# Patient Record
Sex: FEMALE | Race: WHITE | Employment: FULL TIME | URBAN - METROPOLITAN AREA
[De-identification: names, ages, dates, MRNs, and addresses within clinical notes are randomized per-mention and may not be internally consistent; named-entity substitution may affect disease eponyms.]

---

## 2018-04-26 ENCOUNTER — PATIENT OUTREACH (OUTPATIENT)
Dept: FAMILY MEDICINE CLINIC | Facility: CLINIC | Age: 28
End: 2018-04-26

## 2018-04-26 NOTE — PROGRESS NOTES
Left message for patient to call office to confirm PCP or to schedule annual physical and pap smear.

## 2018-09-18 ENCOUNTER — OFFICE VISIT (OUTPATIENT)
Dept: FAMILY MEDICINE CLINIC | Facility: CLINIC | Age: 28
End: 2018-09-18
Payer: COMMERCIAL

## 2018-09-18 VITALS
DIASTOLIC BLOOD PRESSURE: 70 MMHG | SYSTOLIC BLOOD PRESSURE: 110 MMHG | HEART RATE: 58 BPM | HEIGHT: 67 IN | WEIGHT: 128 LBS | OXYGEN SATURATION: 98 % | TEMPERATURE: 99 F | BODY MASS INDEX: 20.09 KG/M2

## 2018-09-18 DIAGNOSIS — B37.3 VAGINA, CANDIDIASIS: ICD-10-CM

## 2018-09-18 DIAGNOSIS — K42.9 UMBILICAL HERNIA WITHOUT OBSTRUCTION AND WITHOUT GANGRENE: Primary | ICD-10-CM

## 2018-09-18 PROCEDURE — 99203 OFFICE O/P NEW LOW 30 MIN: CPT | Performed by: FAMILY MEDICINE

## 2018-09-18 RX ORDER — FLUCONAZOLE 150 MG/1
150 TABLET ORAL DAILY
Qty: 2 TABLET | Refills: 6 | Status: SHIPPED | OUTPATIENT
Start: 2018-09-18 | End: 2018-09-19

## 2018-09-18 NOTE — PROGRESS NOTES
Sharon Rodriguez is a 28year old female. Patient presents with:  Establish Care: hernia abdominal noticed a few a weeks ago. yeast infection symptoms for a few months. . .room 5    Subjective   HPI:   Abdomen hernia  Noted after eating  Above belly butto denies shortness of breath with exertion  CARDIOVASCULAR: denies chest pain on exertion  GI: denies abdominal pain and denies heartburn  See HPI re hernia   see HPI   NEURO: denies headaches     Objective   EXAM:   /70   Pulse 58   Temp 98.6 °F (37

## 2018-09-19 ENCOUNTER — TELEPHONE (OUTPATIENT)
Dept: FAMILY MEDICINE CLINIC | Facility: CLINIC | Age: 28
End: 2018-09-19

## 2018-09-19 DIAGNOSIS — B37.3 VAGINA, CANDIDIASIS: ICD-10-CM

## 2018-09-19 RX ORDER — FLUCONAZOLE 150 MG/1
150 TABLET ORAL DAILY
Qty: 2 TABLET | Refills: 6 | Status: SHIPPED | OUTPATIENT
Start: 2018-09-19 | End: 2018-09-21

## 2018-09-19 NOTE — TELEPHONE ENCOUNTER
fluconazole (DIFLUCAN) 150 MG Oral Tab MEDICATION WAS SENT TO THE WRONG PHARMACY. PLEASE RE-S[facility]

## 2018-09-20 NOTE — PATIENT INSTRUCTIONS
Hernia (Adult)    A hernia can happen when there is a weakness or defect in the wall of the abdomen or groin. Intestines or nearby tissues may move from their usual location and push through the weakness in the wall.  This can cause a hernia (bulge) you m · Incarcerated/strangulated. The intestine is trapped (incarcerated). If this happens, you won’t be able to push the bulge back in. If the incarcerated hernia isn’t treated, it may become strangulated.  This means the area loses blood supply and the tissue · Severe pain, redness, or tenderness in the area near the hernia  · Pain worsens quickly and doesn’t get better  · Inability to have a bowel movement or pass gas  · Fever of 100.4°F (38°C) or higher, or as directed by your healthcare provider  Date Last R

## 2018-10-02 ENCOUNTER — OFFICE VISIT (OUTPATIENT)
Dept: SURGERY | Facility: CLINIC | Age: 28
End: 2018-10-02
Payer: COMMERCIAL

## 2018-10-02 VITALS — BODY MASS INDEX: 20.57 KG/M2 | HEIGHT: 66 IN | WEIGHT: 128 LBS | HEART RATE: 54 BPM | TEMPERATURE: 99 F

## 2018-10-02 DIAGNOSIS — K42.9 UMBILICAL HERNIA WITHOUT OBSTRUCTION AND WITHOUT GANGRENE: Primary | ICD-10-CM

## 2018-10-02 PROCEDURE — 99244 OFF/OP CNSLTJ NEW/EST MOD 40: CPT | Performed by: SURGERY

## 2018-10-02 NOTE — H&P
Chun Alcantar is a 28year old female  Patient presents with:  Hernia      REFERRED BY    Patient presents with pain at the umbilicus.   Patient states that she has been noticing tissue which comes out of her umbilical area and then causes incarceration abuse-last 7/2013      ROS     GENERAL HEALTH: otherwise feels well, no weight loss, no fever or chills  SKIN: denies any unusual skin rashes or jaundice  HEENT: denies nasal congestion, sinus pain or sore throat; hearing loss negative,   EYES , no diplopi NEGATIVE mg/dL Final   • BLOOD URINE 08/22/2013 TRACE-INTACT* NEGATIVE Final   • [facility] URINE 08/22/2013 6.0  4.5 - 8.0 Final   • PROTEIN URINE 08/22/2013 NEGATIVE  NEGATIVE mg/dL Final   • UROBILINOGEN URINE 08/22/2013 0.2  0.2 - 2.0 mg/dL Final   • NITRITE U square meters   • GFR NON- 08/22/2013 113  >60 Final    Estimated GFR units: mL/min/1.73 square meters   • CALCIUM 08/22/2013 8.6* 8.9 - 10.3 mg/dL Final    Comment: Corrected Calcium Formula:                           ((4.0 - ALBUMIN) x 0. PHOSPHATASE 08/23/2013 44* 52 - 144 U/L Final   • AST 08/23/2013 13* 15 - 41 U/L Final   • ALT 08/23/2013 20  14 - 54 U/L Final   • BILIRUBIN, TOTAL 08/23/2013 0.6  0.1 - 2.0 mg/dL Final   • TOTAL PROTEIN 08/23/2013 5.3* 6.1 - 8.3 g/dL Final   • WBC 08/23/

## 2018-12-04 ENCOUNTER — OFFICE VISIT (OUTPATIENT)
Dept: SURGERY | Facility: CLINIC | Age: 28
End: 2018-12-04

## 2018-12-04 VITALS
BODY MASS INDEX: 20.09 KG/M2 | HEIGHT: 66 IN | RESPIRATION RATE: 16 BRPM | HEART RATE: 50 BPM | DIASTOLIC BLOOD PRESSURE: 85 MMHG | WEIGHT: 125 LBS | TEMPERATURE: 98 F | SYSTOLIC BLOOD PRESSURE: 121 MMHG

## 2018-12-04 DIAGNOSIS — K43.2 INCISIONAL HERNIA, WITHOUT OBSTRUCTION OR GANGRENE: Primary | ICD-10-CM

## 2018-12-04 PROCEDURE — 99024 POSTOP FOLLOW-UP VISIT: CPT | Performed by: PHYSICIAN ASSISTANT

## 2018-12-04 NOTE — PROGRESS NOTES
Post Operative Visit Note       Active Problems  1.  Incisional hernia, without obstruction or gangrene         Chief Complaint   Patient presents with:  Post-Op: 11/21 incisional herniorrhaphy with primary repair, pt c/o of dull-ache pain behind belly butt today.     Family History   Problem Relation Age of Onset   • Psychiatric Father    • Other (Other) Father    • Other (anxiety) Father    • Other (Other) Mother    • Other (osteoporosis) Mother      Social History    Socioeconomic History      Marital statu Neurological: Negative for tremors, syncope and weakness. Hematological: Negative for adenopathy. Does not bruise/bleed easily. Psychiatric/Behavioral: Negative for behavioral problems and sleep disturbance.        Physical Findings   /85   Puls patient can see me on an as-needed basis. This patient should return urgently for any problems or complications related to the surgical intervention. The patient voiced understanding and agreement with this plan.            No orders of the defined types

## 2018-12-07 ENCOUNTER — TELEPHONE (OUTPATIENT)
Dept: SURGERY | Facility: CLINIC | Age: 28
End: 2018-12-07

## 2018-12-07 NOTE — TELEPHONE ENCOUNTER
Completed [facility] paperwork faxed to [employer] [phone]. Copies mailed to pt, pt informed.

## 2018-12-07 NOTE — TELEPHONE ENCOUNTER
Pts employer phoned office. Received STD papers, would like to know if Pt can return to work with restrictions after 12/11.  Please ask surgeon if it would be possible and fax letter

## 2019-07-01 ENCOUNTER — OFFICE VISIT (OUTPATIENT)
Dept: OBGYN CLINIC | Facility: CLINIC | Age: 29
End: 2019-07-01
Payer: COMMERCIAL

## 2019-07-01 VITALS
SYSTOLIC BLOOD PRESSURE: 102 MMHG | DIASTOLIC BLOOD PRESSURE: 80 MMHG | HEIGHT: 66 IN | WEIGHT: 137 LBS | BODY MASS INDEX: 22.02 KG/M2

## 2019-07-01 DIAGNOSIS — Z12.4 SCREENING FOR MALIGNANT NEOPLASM OF CERVIX: ICD-10-CM

## 2019-07-01 DIAGNOSIS — Z11.3 SCREEN FOR SEXUALLY TRANSMITTED DISEASES: ICD-10-CM

## 2019-07-01 DIAGNOSIS — N76.0 VAGINITIS AND VULVOVAGINITIS: ICD-10-CM

## 2019-07-01 DIAGNOSIS — Z12.39 ENCOUNTER FOR SCREENING BREAST EXAMINATION: ICD-10-CM

## 2019-07-01 DIAGNOSIS — Z01.419 WELL WOMAN EXAM WITH ROUTINE GYNECOLOGICAL EXAM: Primary | ICD-10-CM

## 2019-07-01 PROCEDURE — 87660 TRICHOMONAS VAGIN DIR PROBE: CPT | Performed by: NURSE PRACTITIONER

## 2019-07-01 PROCEDURE — 87510 GARDNER VAG DNA DIR PROBE: CPT | Performed by: NURSE PRACTITIONER

## 2019-07-01 PROCEDURE — 99385 PREV VISIT NEW AGE 18-39: CPT | Performed by: NURSE PRACTITIONER

## 2019-07-01 PROCEDURE — 88175 CYTOPATH C/V AUTO FLUID REDO: CPT | Performed by: NURSE PRACTITIONER

## 2019-07-01 PROCEDURE — 87624 HPV HI-RISK TYP POOLED RSLT: CPT | Performed by: NURSE PRACTITIONER

## 2019-07-01 PROCEDURE — 87591 N.GONORRHOEAE DNA AMP PROB: CPT | Performed by: NURSE PRACTITIONER

## 2019-07-01 PROCEDURE — 87491 CHLMYD TRACH DNA AMP PROBE: CPT | Performed by: NURSE PRACTITIONER

## 2019-07-01 PROCEDURE — 87480 CANDIDA DNA DIR PROBE: CPT | Performed by: NURSE PRACTITIONER

## 2019-07-01 NOTE — PROGRESS NOTES
HPI:   Bettyjane Schirmer is a 33year old  who presents for an annual gynecological exam. Pt reports she has had vaginal odor and discharge \"on and off\" for the past year. She has tried treating it with Monistat OTC, but this has not helped.  She d CONSTITUTIONAL: generally feels well   SKIN: denies any unusual skin lesions, rash, itchiness  HEENT: denies nasal congestion, sinus pain or sore throat; denies blurred vision, visual changes  CARDIOVASCULAR: denies chest pain   LUNGS:  denies shortness specifically with yogurt, milk, and cheeses. · I encouraged pt to maintain taking a daily women's vitamin.   · I encouraged monthly self breast exams; pt was told to perform these in the shower; she was instructed to perform these 7-10 days after her mense

## 2019-07-02 LAB
C TRACH DNA SPEC QL NAA+PROBE: NEGATIVE
HPV I/H RISK 1 DNA SPEC QL NAA+PROBE: NEGATIVE
N GONORRHOEA DNA SPEC QL NAA+PROBE: NEGATIVE

## 2019-07-02 RX ORDER — METRONIDAZOLE 500 MG/1
500 TABLET ORAL 2 TIMES DAILY
Qty: 14 TABLET | Refills: 0 | Status: SHIPPED | OUTPATIENT
Start: 2019-07-02 | End: 2019-07-09

## 2019-07-03 ENCOUNTER — TELEPHONE (OUTPATIENT)
Dept: OBGYN CLINIC | Facility: CLINIC | Age: 29
End: 2019-07-03

## 2019-07-22 ENCOUNTER — TELEPHONE (OUTPATIENT)
Dept: OBGYN CLINIC | Facility: CLINIC | Age: 29
End: 2019-07-22

## 2019-07-22 RX ORDER — METRONIDAZOLE 500 MG/1
500 TABLET ORAL 2 TIMES DAILY
Qty: 14 TABLET | Refills: 0 | Status: SHIPPED | OUTPATIENT
Start: 2019-07-22 | End: 2019-07-29

## 2019-07-22 NOTE — TELEPHONE ENCOUNTER
Seen 2 weeks ago for   a bacterial infection, done with medication. She then started her period and then symptoms came back.  Refill medication

## 2019-07-22 NOTE — TELEPHONE ENCOUNTER
Patient was treated a few weeks ago for bv. Had her menses and the symptoms came back. Has white milky discharge, thick with odor. Asking for refill on the Flagyl?

## 2019-07-25 PROBLEM — Z01.419 WELL WOMAN EXAM WITH ROUTINE GYNECOLOGICAL EXAM: Status: RESOLVED | Noted: 2019-07-01 | Resolved: 2019-07-25

## 2019-07-25 PROBLEM — K43.2 INCISIONAL HERNIA, WITHOUT OBSTRUCTION OR GANGRENE: Status: RESOLVED | Noted: 2018-12-04 | Resolved: 2019-07-25

## 2019-07-25 PROBLEM — N76.0 VAGINITIS AND VULVOVAGINITIS: Status: RESOLVED | Noted: 2019-07-01 | Resolved: 2019-07-25

## 2019-07-25 NOTE — PROGRESS NOTES
HPI:   Anxiety   This is a recurrent problem. Episode onset: more in the last 2 years, worse in last 2 months. She is having a lot of trouble sleeping. The problem occurs daily. Progression since onset: affecting her sleep.  Pertinent negatives include no APPENDECTOMY N/A 8/22/2013    Performed by Dustin Kwon MD at [facility] MAIN OR   • [address] [place] [address]  @ 11 yrs old   • MYRINGOTOMY, LASER-ASSISTED      Right ear with less hearing.     • TONSILLECTOMY        Family History   Problem Relation appears anxious (milldy). Withdrawn: slightly. Cognition and memory are normal.          ASSESSMENT AND PLAN:   Diagnoses and all orders for this visit:    Social anxiety disorder  -      NAVIGATOR  -     clonazePAM 0.5 MG Oral Tablet Dispersible;  Take 1

## 2019-08-13 DIAGNOSIS — F40.10 SOCIAL ANXIETY DISORDER: ICD-10-CM

## 2019-08-13 RX ORDER — CLONAZEPAM 0.5 MG/1
0.5 TABLET, ORALLY DISINTEGRATING ORAL NIGHTLY PRN
Qty: 10 TABLET | Refills: 0 | Status: SHIPPED | OUTPATIENT
Start: 2019-08-13 | End: 2019-12-03

## 2019-08-13 NOTE — TELEPHONE ENCOUNTER
Electronically submitted to pharmacy  --Dayan---I filled this as you were done 8/13/2019 ---just to let you know---if there is a need to change treatment plans or further refills--let me know---MARGARET

## 2019-12-03 ENCOUNTER — OFFICE VISIT (OUTPATIENT)
Dept: OBGYN CLINIC | Facility: CLINIC | Age: 29
End: 2019-12-03
Payer: COMMERCIAL

## 2019-12-03 VITALS
BODY MASS INDEX: 21.38 KG/M2 | RESPIRATION RATE: 12 BRPM | HEART RATE: 80 BPM | TEMPERATURE: 98 F | HEIGHT: 66.25 IN | WEIGHT: 133 LBS | DIASTOLIC BLOOD PRESSURE: 80 MMHG | SYSTOLIC BLOOD PRESSURE: 128 MMHG

## 2019-12-03 DIAGNOSIS — N76.0 VAGINITIS AND VULVOVAGINITIS: Primary | ICD-10-CM

## 2019-12-03 DIAGNOSIS — N89.8 VAGINAL DISCHARGE: ICD-10-CM

## 2019-12-03 DIAGNOSIS — Z11.3 SCREEN FOR SEXUALLY TRANSMITTED DISEASES: ICD-10-CM

## 2019-12-03 PROCEDURE — 87480 CANDIDA DNA DIR PROBE: CPT | Performed by: NURSE PRACTITIONER

## 2019-12-03 PROCEDURE — 87491 CHLMYD TRACH DNA AMP PROBE: CPT | Performed by: NURSE PRACTITIONER

## 2019-12-03 PROCEDURE — 99213 OFFICE O/P EST LOW 20 MIN: CPT | Performed by: NURSE PRACTITIONER

## 2019-12-03 PROCEDURE — 87510 GARDNER VAG DNA DIR PROBE: CPT | Performed by: NURSE PRACTITIONER

## 2019-12-03 PROCEDURE — 87660 TRICHOMONAS VAGIN DIR PROBE: CPT | Performed by: NURSE PRACTITIONER

## 2019-12-03 PROCEDURE — 87591 N.GONORRHOEAE DNA AMP PROB: CPT | Performed by: NURSE PRACTITIONER

## 2019-12-03 RX ORDER — TRAZODONE HYDROCHLORIDE 50 MG/1
50 TABLET ORAL NIGHTLY
COMMUNITY
End: 2020-09-16

## 2019-12-04 RX ORDER — METRONIDAZOLE 500 MG/1
500 TABLET ORAL 2 TIMES DAILY
Qty: 14 TABLET | Refills: 0 | Status: SHIPPED | OUTPATIENT
Start: 2019-12-04 | End: 2019-12-11

## 2020-01-08 ENCOUNTER — OFFICE VISIT (OUTPATIENT)
Dept: FAMILY MEDICINE CLINIC | Facility: CLINIC | Age: 30
End: 2020-01-08
Payer: COMMERCIAL

## 2020-01-08 VITALS
RESPIRATION RATE: 16 BRPM | BODY MASS INDEX: 22.18 KG/M2 | TEMPERATURE: 99 F | OXYGEN SATURATION: 99 % | HEIGHT: 66.25 IN | DIASTOLIC BLOOD PRESSURE: 78 MMHG | SYSTOLIC BLOOD PRESSURE: 122 MMHG | HEART RATE: 80 BPM | WEIGHT: 138 LBS

## 2020-01-08 DIAGNOSIS — J06.9 VIRAL URI: Primary | ICD-10-CM

## 2020-01-08 PROCEDURE — 99213 OFFICE O/P EST LOW 20 MIN: CPT | Performed by: NURSE PRACTITIONER

## 2020-01-08 NOTE — PROGRESS NOTES
HPI:   Sinus Problem   This is a new problem. Episode onset: 2 days ago. Maximum temperature: possibly, didn't check. Associated symptoms include congestion, coughing (slightly), headaches, sinus pressure and sneezing.  Pertinent negatives include no chil HENT: Positive for congestion, postnasal drip, rhinorrhea, sinus pressure and sneezing. Negative for ear pain and sore throat. Respiratory: Positive for cough (slightly). Negative for chest tightness, shortness of breath and wheezing.     Conrado Haynes

## 2020-09-16 ENCOUNTER — TELEPHONE (OUTPATIENT)
Dept: FAMILY MEDICINE CLINIC | Facility: CLINIC | Age: 30
End: 2020-09-16

## 2020-09-16 ENCOUNTER — TELEMEDICINE (OUTPATIENT)
Dept: FAMILY MEDICINE CLINIC | Facility: CLINIC | Age: 30
End: 2020-09-16
Payer: COMMERCIAL

## 2020-09-16 VITALS
HEIGHT: 66.25 IN | HEART RATE: 86 BPM | WEIGHT: 142 LBS | DIASTOLIC BLOOD PRESSURE: 68 MMHG | SYSTOLIC BLOOD PRESSURE: 120 MMHG | RESPIRATION RATE: 18 BRPM | BODY MASS INDEX: 22.82 KG/M2 | TEMPERATURE: 98 F | OXYGEN SATURATION: 99 %

## 2020-09-16 DIAGNOSIS — R00.1 BRADYCARDIA: Primary | ICD-10-CM

## 2020-09-16 DIAGNOSIS — R05.9 COUGH: ICD-10-CM

## 2020-09-16 DIAGNOSIS — I49.8 PERIODIC HEART FLUTTER: ICD-10-CM

## 2020-09-16 DIAGNOSIS — R00.2 PALPITATIONS: ICD-10-CM

## 2020-09-16 PROCEDURE — 3074F SYST BP LT 130 MM HG: CPT | Performed by: NURSE PRACTITIONER

## 2020-09-16 PROCEDURE — 3008F BODY MASS INDEX DOCD: CPT | Performed by: NURSE PRACTITIONER

## 2020-09-16 PROCEDURE — 3078F DIAST BP <80 MM HG: CPT | Performed by: NURSE PRACTITIONER

## 2020-09-16 PROCEDURE — 93000 ELECTROCARDIOGRAM COMPLETE: CPT | Performed by: NURSE PRACTITIONER

## 2020-09-16 PROCEDURE — 99214 OFFICE O/P EST MOD 30 MIN: CPT | Performed by: NURSE PRACTITIONER

## 2020-09-16 NOTE — TELEPHONE ENCOUNTER
Romy Frazier with [facility] is calling to get the EKG that was done on Tevin Merrill for her appt 9/17/20 please fax [phone] to [address] [place] [address] Avenue

## 2020-09-16 NOTE — TELEPHONE ENCOUNTER
Virtual/Telephone Check-In    Leroy Cabral verbally consents to a Virtual/Telephone Check-In service on 09/16/20. Patient has been referred to the [facility] website at [email or web address] to review the yearly Consent to Treat document.   Patient under

## 2020-09-16 NOTE — PROGRESS NOTES
HPI: HPI   Patient is here for a cough and palpitations. Reports that she went to [facility] on 8/26 for curbside evaluation of cough. COVID testing done and was negative.  She completed a Zpak and given Tessalon to use as needed for the week      Drinks per session: 5 or 6      Binge frequency: Weekly      Comment: 6 cans of beer 4 ormore times a week    Drug use: Yes      Frequency: 4.0 times per week      Types: Cannabis, Cocaine, \"Crack\" cocaine      Comment: smoked around 2 grams of INTERNAL  -     CARDIO - INTERNAL    Cough  Comments:  Discussed lingering cough after bronchitis.  Continue monitor 02 sat, if dropping below 90% needs to follow up or go to ED  Orders:  -     ELECTROCARDIOGRAM, COMPLETE    Periodic heart flutter  -     EL

## 2020-09-16 NOTE — PROGRESS NOTES
Patient initially scheduled as virtual visit. Has had a cough for 1 month. Went to [facility]. Had negative COVID test and completed a Zpak. Also given Tessalon but continues to have a terrible cough. Also concerned about a low heart rate.

## 2020-09-17 ENCOUNTER — OFFICE VISIT (OUTPATIENT)
Dept: CARDIOLOGY | Age: 30
End: 2020-09-17

## 2020-09-17 VITALS
HEART RATE: 47 BPM | SYSTOLIC BLOOD PRESSURE: 112 MMHG | HEIGHT: 67 IN | WEIGHT: 144 LBS | BODY MASS INDEX: 22.6 KG/M2 | DIASTOLIC BLOOD PRESSURE: 78 MMHG

## 2020-09-17 DIAGNOSIS — R00.1 BRADYCARDIA, UNSPECIFIED: Primary | ICD-10-CM

## 2020-09-17 LAB
ABSOLUTE BASOPHILS: 60 CELLS/UL (ref 0–200)
ABSOLUTE EOSINOPHILS: 462 CELLS/UL (ref 15–500)
ABSOLUTE LYMPHOCYTES: 1614 CELLS/UL (ref 850–3900)
ABSOLUTE MONOCYTES: 546 CELLS/UL (ref 200–950)
ABSOLUTE NEUTROPHILS: 3318 CELLS/UL (ref 1500–7800)
ALBUMIN/GLOBULIN RATIO: 2.2 (CALC) (ref 1–2.5)
ALBUMIN: 4.6 G/DL (ref 3.6–5.1)
ALKALINE PHOSPHATASE: 61 U/L (ref 31–125)
ALT: 16 U/L (ref 6–29)
AST: 16 U/L (ref 10–30)
BASOPHILS: 1 %
BILIRUBIN, TOTAL: 0.8 MG/DL (ref 0.2–1.2)
BUN: 9 MG/DL (ref 7–25)
CALCIUM: 9.7 MG/DL (ref 8.6–10.2)
CARBON DIOXIDE: 26 MMOL/L (ref 20–32)
CHLORIDE: 107 MMOL/L (ref 98–110)
CREATININE: 0.81 MG/DL (ref 0.5–1.1)
EGFR IF AFRICN AM: 113 ML/MIN/1.73M2
EGFR IF NONAFRICN AM: 97 ML/MIN/1.73M2
EOSINOPHILS: 7.7 %
GLOBULIN: 2.1 G/DL (CALC) (ref 1.9–3.7)
GLUCOSE: 80 MG/DL (ref 65–99)
HEMATOCRIT: 39.4 % (ref 35–45)
HEMOGLOBIN: 13.7 G/DL (ref 11.7–15.5)
LYMPHOCYTES: 26.9 %
MCH: 30.6 PG (ref 27–33)
MCHC: 34.8 G/DL (ref 32–36)
MCV: 87.9 FL (ref 80–100)
MONOCYTES: 9.1 %
MPV: 11.9 FL (ref 7.5–12.5)
NEUTROPHILS: 55.3 %
PLATELET COUNT: 223 THOUSAND/UL (ref 140–400)
POTASSIUM: 4.7 MMOL/L (ref 3.5–5.3)
PROTEIN, TOTAL: 6.7 G/DL (ref 6.1–8.1)
RDW: 12.8 % (ref 11–15)
RED BLOOD CELL COUNT: 4.48 MILLION/UL (ref 3.8–5.1)
SODIUM: 140 MMOL/L (ref 135–146)
TSH: 1.54 MIU/L
WHITE BLOOD CELL COUNT: 6 THOUSAND/UL (ref 3.8–10.8)

## 2020-09-17 PROCEDURE — 93000 ELECTROCARDIOGRAM COMPLETE: CPT | Performed by: INTERNAL MEDICINE

## 2020-09-17 PROCEDURE — 99205 OFFICE O/P NEW HI 60 MIN: CPT | Performed by: INTERNAL MEDICINE

## 2020-09-17 SDOH — HEALTH STABILITY: PHYSICAL HEALTH: ON AVERAGE, HOW MANY MINUTES DO YOU ENGAGE IN EXERCISE AT THIS LEVEL?: 40 MIN

## 2020-09-17 SDOH — SOCIAL STABILITY: SOCIAL NETWORK: ARE YOU MARRIED, WIDOWED, DIVORCED, SEPARATED, NEVER MARRIED, OR LIVING WITH A PARTNER?: NEVER MARRIED

## 2020-09-17 SDOH — HEALTH STABILITY: PHYSICAL HEALTH: ON AVERAGE, HOW MANY DAYS PER WEEK DO YOU ENGAGE IN MODERATE TO STRENUOUS EXERCISE (LIKE A BRISK WALK)?: 5 DAYS

## 2020-09-17 ASSESSMENT — PATIENT HEALTH QUESTIONNAIRE - PHQ9
SUM OF ALL RESPONSES TO PHQ9 QUESTIONS 1 AND 2: 0
2. FEELING DOWN, DEPRESSED OR HOPELESS: NOT AT ALL
SUM OF ALL RESPONSES TO PHQ9 QUESTIONS 1 AND 2: 0
CLINICAL INTERPRETATION OF PHQ9 SCORE: NO FURTHER SCREENING NEEDED
CLINICAL INTERPRETATION OF PHQ2 SCORE: NO FURTHER SCREENING NEEDED
1. LITTLE INTEREST OR PLEASURE IN DOING THINGS: NOT AT ALL

## 2020-09-17 ASSESSMENT — ENCOUNTER SYMPTOMS
HEMOPTYSIS: 0
COUGH: 0
BRUISES/BLEEDS EASILY: 0
SUSPICIOUS LESIONS: 0
ALLERGIC/IMMUNOLOGIC COMMENTS: NO NEW FOOD ALLERGIES
FEVER: 0
WEIGHT GAIN: 0
CHILLS: 0
HEMATOCHEZIA: 0
WEIGHT LOSS: 0

## 2020-09-22 ENCOUNTER — ANCILLARY PROCEDURE (OUTPATIENT)
Dept: CARDIOLOGY | Age: 30
End: 2020-09-22
Attending: INTERNAL MEDICINE

## 2020-09-22 DIAGNOSIS — R00.1 BRADYCARDIA, UNSPECIFIED: ICD-10-CM

## 2020-09-22 PROCEDURE — 93224 XTRNL ECG REC UP TO 48 HRS: CPT | Performed by: INTERNAL MEDICINE

## 2021-05-24 ENCOUNTER — MED REC SCAN ONLY (OUTPATIENT)
Dept: FAMILY MEDICINE CLINIC | Facility: CLINIC | Age: 31
End: 2021-05-24

## 2021-08-25 ENCOUNTER — OFFICE VISIT (OUTPATIENT)
Dept: FAMILY MEDICINE CLINIC | Facility: CLINIC | Age: 31
End: 2021-08-25
Payer: COMMERCIAL

## 2021-08-25 VITALS
TEMPERATURE: 100 F | SYSTOLIC BLOOD PRESSURE: 120 MMHG | BODY MASS INDEX: 21.74 KG/M2 | WEIGHT: 135.25 LBS | DIASTOLIC BLOOD PRESSURE: 74 MMHG | HEART RATE: 83 BPM | OXYGEN SATURATION: 100 % | RESPIRATION RATE: 18 BRPM | HEIGHT: 66.25 IN

## 2021-08-25 DIAGNOSIS — R53.83 OTHER FATIGUE: Primary | ICD-10-CM

## 2021-08-25 PROCEDURE — 3078F DIAST BP <80 MM HG: CPT | Performed by: INTERNAL MEDICINE

## 2021-08-25 PROCEDURE — 3074F SYST BP LT 130 MM HG: CPT | Performed by: INTERNAL MEDICINE

## 2021-08-25 PROCEDURE — 99214 OFFICE O/P EST MOD 30 MIN: CPT | Performed by: INTERNAL MEDICINE

## 2021-08-25 PROCEDURE — 3008F BODY MASS INDEX DOCD: CPT | Performed by: INTERNAL MEDICINE

## 2021-08-26 LAB — SARS-COV-2 RNA RESP QL NAA+PROBE: NOT DETECTED

## 2021-08-26 NOTE — PROGRESS NOTES
HPI:   Reggie Miller is a 31year old female who presents for upper respiratory symptoms for  6  days. Patient reports extreme fatigue, works from home and has not been working tis week/ . No current outpatient medications on file.       Past Medical Pulse 83   Temp 99.9 °F (37.7 °C) (Temporal)   Resp 18   Ht 5' 6.25\" (1.683 m)   Wt 135 lb 4 oz (61.3 kg)   LMP 09/13/2020   SpO2 100%   BMI 21.67 kg/m²   GENERAL: well developed, well nourished,in no apparent distress  SKIN: no rashes,no suspicious lesio

## 2021-09-08 ENCOUNTER — TELEPHONE (OUTPATIENT)
Dept: FAMILY MEDICINE CLINIC | Facility: CLINIC | Age: 31
End: 2021-09-08

## 2021-09-08 NOTE — TELEPHONE ENCOUNTER
LM for patient that paperwork was completed with these dates and faxed to [facility] [phone].

## 2021-09-08 NOTE — TELEPHONE ENCOUNTER
Pt. Stating she saw Dr. Yeny Wilkinson in the [facility] and asking if Dr. Yeny Wilkinson received FMLA pw from [facility].  They need it back today

## 2022-01-08 ENCOUNTER — TELEMEDICINE (OUTPATIENT)
Dept: FAMILY MEDICINE CLINIC | Facility: CLINIC | Age: 32
End: 2022-01-08

## 2022-01-08 DIAGNOSIS — H10.31 ACUTE BACTERIAL CONJUNCTIVITIS OF RIGHT EYE: Primary | ICD-10-CM

## 2022-01-08 PROCEDURE — 99213 OFFICE O/P EST LOW 20 MIN: CPT | Performed by: FAMILY MEDICINE

## 2022-01-08 RX ORDER — TRAZODONE HYDROCHLORIDE 50 MG/1
TABLET ORAL
COMMUNITY
Start: 2021-09-20

## 2022-01-08 RX ORDER — TOBRAMYCIN 3 MG/ML
1 SOLUTION/ DROPS OPHTHALMIC EVERY 4 HOURS
Qty: 5 ML | Refills: 0 | Status: SHIPPED | OUTPATIENT
Start: 2022-01-08 | End: 2022-01-13

## 2022-01-10 NOTE — PROGRESS NOTES
Virtual/Telephone Check-In    Vanda Cartercaryky  verbally consents to a Virtual/Telephone Check-In service on 1/08/2022 .   Patient understands and accepts financial responsibility for any deductible, co-insurance and/or co-pays associated with this service yrs old   • MYRINGOTOMY, LASER-ASSISTED      Right ear with less hearing.     • TONSILLECTOMY        Family History   Problem Relation Age of Onset   • Psychiatric Father    • Other (Other) Father    • Other (anxiety) Father    • Other (Other) Mother    • O plan.  The patient is asked to return if sx's persist or worsen. Patient had an opportunity to ask questions and they were answered to her satisfaction.       \"Please note that this visit was completed using two-way, real-time interactive audio and/or v

## 2022-03-09 ENCOUNTER — TELEPHONE (OUTPATIENT)
Dept: FAMILY MEDICINE CLINIC | Facility: CLINIC | Age: 32
End: 2022-03-09

## 2022-03-09 NOTE — TELEPHONE ENCOUNTER
traZODone 50 MG Oral Tab  - [facility] [address] [place] - Pt knows that dr didn't prescribe this for her    Pt was prescribed vistaril 25 mg when she was at rehab in [place]. She wanted to know if dr will refill the medication for her too.

## 2022-03-09 NOTE — TELEPHONE ENCOUNTER
Patient advised she will need an office visit before medication can be prescribed.    Future Appointments   Date Time Provider Charles Breen   3/11/2022 10:00 AM Nury Nova MD [facility] [place]

## 2022-06-20 ENCOUNTER — OFFICE VISIT (OUTPATIENT)
Dept: PODIATRY CLINIC | Facility: CLINIC | Age: 32
End: 2022-06-20
Payer: COMMERCIAL

## 2022-06-20 VITALS — DIASTOLIC BLOOD PRESSURE: 64 MMHG | SYSTOLIC BLOOD PRESSURE: 110 MMHG

## 2022-06-20 DIAGNOSIS — G57.81 NEUROMA OF THIRD INTERSPACE OF RIGHT FOOT: Primary | ICD-10-CM

## 2022-06-20 DIAGNOSIS — G57.61 MORTON'S METATARSALGIA, NEURALGIA, OR NEUROMA, RIGHT: ICD-10-CM

## 2022-06-20 RX ORDER — TRIAMCINOLONE ACETONIDE 40 MG/ML
20 INJECTION, SUSPENSION INTRA-ARTICULAR; INTRAMUSCULAR ONCE
Status: COMPLETED | OUTPATIENT
Start: 2022-06-20 | End: 2022-06-20

## 2022-06-20 RX ORDER — TRIAMCINOLONE ACETONIDE 40 MG/ML
20 INJECTION, SUSPENSION INTRA-ARTICULAR; INTRAMUSCULAR ONCE
Status: DISCONTINUED | OUTPATIENT
Start: 2022-06-20 | End: 2022-06-20

## 2022-07-09 NOTE — ED INITIAL ASSESSMENT (HPI)
Pt states last night after eating made herself vomit  And felt anxious and sob, today while running increase in sob,

## 2022-07-21 ENCOUNTER — TELEPHONE (OUTPATIENT)
Dept: FAMILY MEDICINE CLINIC | Facility: CLINIC | Age: 32
End: 2022-07-21

## 2022-07-21 NOTE — TELEPHONE ENCOUNTER
Patient called back and scheduled with [facility] for tomorrow.   Future Appointments   Date Time Provider Charles Breen   7/22/2022 12:20 PM CALEB Quach [facility] [place]

## 2022-07-22 ENCOUNTER — OFFICE VISIT (OUTPATIENT)
Dept: FAMILY MEDICINE CLINIC | Facility: CLINIC | Age: 32
End: 2022-07-22
Payer: COMMERCIAL

## 2022-07-22 VITALS
SYSTOLIC BLOOD PRESSURE: 110 MMHG | HEIGHT: 66 IN | WEIGHT: 140 LBS | TEMPERATURE: 98 F | RESPIRATION RATE: 16 BRPM | BODY MASS INDEX: 22.5 KG/M2 | HEART RATE: 53 BPM | OXYGEN SATURATION: 99 % | DIASTOLIC BLOOD PRESSURE: 74 MMHG

## 2022-07-22 DIAGNOSIS — F50.02 ANOREXIA NERVOSA, BINGE-EATING PURGING TYPE: Chronic | ICD-10-CM

## 2022-07-22 DIAGNOSIS — K29.00 OTHER ACUTE GASTRITIS WITHOUT HEMORRHAGE: Primary | ICD-10-CM

## 2022-07-22 RX ORDER — PANTOPRAZOLE SODIUM 40 MG/1
40 TABLET, DELAYED RELEASE ORAL
Qty: 90 TABLET | Refills: 0 | Status: SHIPPED | OUTPATIENT
Start: 2022-07-22

## 2022-08-05 DIAGNOSIS — F41.9 ANXIETY: ICD-10-CM

## 2022-08-06 RX ORDER — TRAZODONE HYDROCHLORIDE 50 MG/1
50 TABLET ORAL NIGHTLY PRN
Qty: 30 TABLET | Refills: 2 | Status: SHIPPED | OUTPATIENT
Start: 2022-08-06

## 2022-09-09 ENCOUNTER — TELEPHONE (OUTPATIENT)
Dept: FAMILY MEDICINE CLINIC | Facility: CLINIC | Age: 32
End: 2022-09-09

## 2022-09-09 ENCOUNTER — OFFICE VISIT (OUTPATIENT)
Dept: FAMILY MEDICINE CLINIC | Facility: CLINIC | Age: 32
End: 2022-09-09
Payer: COMMERCIAL

## 2022-09-09 VITALS
DIASTOLIC BLOOD PRESSURE: 62 MMHG | OXYGEN SATURATION: 99 % | RESPIRATION RATE: 16 BRPM | HEIGHT: 66 IN | WEIGHT: 140 LBS | BODY MASS INDEX: 22.5 KG/M2 | SYSTOLIC BLOOD PRESSURE: 100 MMHG | TEMPERATURE: 98 F | HEART RATE: 43 BPM

## 2022-09-09 DIAGNOSIS — S80.812A ABRASION OF LEFT LOWER EXTREMITY, INITIAL ENCOUNTER: Primary | ICD-10-CM

## 2022-09-09 PROCEDURE — 3074F SYST BP LT 130 MM HG: CPT | Performed by: NURSE PRACTITIONER

## 2022-09-09 PROCEDURE — 3008F BODY MASS INDEX DOCD: CPT | Performed by: NURSE PRACTITIONER

## 2022-09-09 PROCEDURE — 99213 OFFICE O/P EST LOW 20 MIN: CPT | Performed by: NURSE PRACTITIONER

## 2022-09-09 PROCEDURE — 3078F DIAST BP <80 MM HG: CPT | Performed by: NURSE PRACTITIONER

## 2022-09-09 NOTE — TELEPHONE ENCOUNTER
Pt states her dog leash got wrapped around her ankle and caused a lesion. Site is red and tender to touch, states it looks like a \"rope burn\". Concerned about infection. Sx noted for the past 2 days. No openings in-office. Referred pt to [facility] in [place] for further evaluation.

## 2022-09-09 NOTE — TELEPHONE ENCOUNTER
Pt was playing ball with her dog outside and his leash got wrapped around her ankle and she has a rope burn that's getting infected, can she get med's?

## 2022-09-12 ENCOUNTER — OFFICE VISIT (OUTPATIENT)
Dept: FAMILY MEDICINE CLINIC | Facility: CLINIC | Age: 32
End: 2022-09-12
Payer: COMMERCIAL

## 2022-09-12 DIAGNOSIS — Z23 NEED FOR DIPHTHERIA-TETANUS-PERTUSSIS (TDAP) VACCINE: Primary | ICD-10-CM

## 2022-09-12 PROCEDURE — 90471 IMMUNIZATION ADMIN: CPT | Performed by: NURSE PRACTITIONER

## 2022-09-12 PROCEDURE — 90715 TDAP VACCINE 7 YRS/> IM: CPT | Performed by: NURSE PRACTITIONER

## 2022-11-02 DIAGNOSIS — F41.9 ANXIETY: ICD-10-CM

## 2022-11-03 RX ORDER — TRAZODONE HYDROCHLORIDE 50 MG/1
50 TABLET ORAL NIGHTLY PRN
Qty: 30 TABLET | Refills: 2 | Status: SHIPPED | OUTPATIENT
Start: 2022-11-03

## 2022-11-30 ENCOUNTER — OFFICE VISIT (OUTPATIENT)
Facility: CLINIC | Age: 32
End: 2022-11-30
Payer: COMMERCIAL

## 2022-11-30 VITALS
HEIGHT: 66 IN | BODY MASS INDEX: 22.82 KG/M2 | WEIGHT: 142 LBS | DIASTOLIC BLOOD PRESSURE: 76 MMHG | SYSTOLIC BLOOD PRESSURE: 108 MMHG

## 2022-11-30 DIAGNOSIS — N63.25 BREAST LUMP ON LEFT SIDE AT 12 O'CLOCK POSITION: ICD-10-CM

## 2022-11-30 DIAGNOSIS — Z01.419 WELL WOMAN EXAM WITH ROUTINE GYNECOLOGICAL EXAM: Primary | ICD-10-CM

## 2022-11-30 PROCEDURE — 3008F BODY MASS INDEX DOCD: CPT

## 2022-11-30 PROCEDURE — 3078F DIAST BP <80 MM HG: CPT

## 2022-11-30 PROCEDURE — 3074F SYST BP LT 130 MM HG: CPT

## 2022-11-30 PROCEDURE — 99385 PREV VISIT NEW AGE 18-39: CPT

## 2022-11-30 PROCEDURE — 87491 CHLMYD TRACH DNA AMP PROBE: CPT

## 2022-11-30 PROCEDURE — 87624 HPV HI-RISK TYP POOLED RSLT: CPT

## 2022-11-30 PROCEDURE — 87591 N.GONORRHOEAE DNA AMP PROB: CPT

## 2022-12-02 ENCOUNTER — TELEPHONE (OUTPATIENT)
Dept: FAMILY MEDICINE CLINIC | Facility: CLINIC | Age: 32
End: 2022-12-02

## 2022-12-02 ENCOUNTER — TELEPHONE (OUTPATIENT)
Facility: CLINIC | Age: 32
End: 2022-12-02

## 2022-12-02 NOTE — TELEPHONE ENCOUNTER
Pt noticed 2 pea sized lumps on each side of her groin. Describes as hard. No increase in size since noticing them. Denies pain, tenderness, fever, chills, dysuria. Patient has an order for [address] and would like to know if she can get an imaging order to evaluate the groin area or if she should come in for a visit.      Last [facility] 11/30/22

## 2022-12-02 NOTE — TELEPHONE ENCOUNTER
Pt saw sarai rebolledo & ordered an ultrasound. She found a cyst in her breast.  Pt has noticed her lymph nodes in her groin area are hard & wanted to discuss.

## 2022-12-02 NOTE — TELEPHONE ENCOUNTER
Spoke with pt. I let her know Keyona Kid would like to see her to evaluate the lumps in her groin prior to ordering imaging. Appointment scheduled 125/22 at 3:00 in the [facility] office. Verbalized understanding.

## 2022-12-02 NOTE — TELEPHONE ENCOUNTER
Pt forgot to let Gemini Portillo know that she has enlarged and hard lymph nodes in her groin area; pls call

## 2022-12-02 NOTE — TELEPHONE ENCOUNTER
Phone call from patient. States that she noticed that her lymph nodes in groin (bilateral) - feels firm. States they are the size of a pea. No pain. Noticed one month ago. Also, has an appointment for an ultrasound of her breast after seeing her gyne. To see you for groin lymph nodes?

## 2022-12-02 NOTE — TELEPHONE ENCOUNTER
Called patient back, she is going to contact her Gyne in regard to this. Any changes to call us back, verbalized understanding.

## 2022-12-02 NOTE — TELEPHONE ENCOUNTER
I could see this in the office, however it also sounds like it could be a gynecologic issue she may want to discuss again or bring up with her gynecology provider.     If there are no fever pain or changes we can see this in the office but do not appear to need to rush visit for the

## 2022-12-05 ENCOUNTER — OFFICE VISIT (OUTPATIENT)
Dept: OBGYN CLINIC | Facility: CLINIC | Age: 32
End: 2022-12-05
Payer: COMMERCIAL

## 2022-12-05 VITALS
HEIGHT: 66 IN | DIASTOLIC BLOOD PRESSURE: 75 MMHG | BODY MASS INDEX: 22.66 KG/M2 | HEART RATE: 60 BPM | SYSTOLIC BLOOD PRESSURE: 116 MMHG | WEIGHT: 141 LBS

## 2022-12-05 DIAGNOSIS — R59.9 LYMPH NODE ENLARGEMENT: Primary | ICD-10-CM

## 2023-01-31 DIAGNOSIS — F41.9 ANXIETY: ICD-10-CM

## 2023-01-31 RX ORDER — TRAZODONE HYDROCHLORIDE 50 MG/1
TABLET ORAL
Qty: 30 TABLET | Refills: 2 | Status: SHIPPED | OUTPATIENT
Start: 2023-01-31

## 2023-02-02 ENCOUNTER — TELEPHONE (OUTPATIENT)
Dept: FAMILY MEDICINE CLINIC | Facility: CLINIC | Age: 33
End: 2023-02-02

## 2023-02-02 DIAGNOSIS — F41.9 ANXIETY: ICD-10-CM

## 2023-03-25 ENCOUNTER — OFFICE VISIT (OUTPATIENT)
Dept: FAMILY MEDICINE CLINIC | Facility: CLINIC | Age: 33
End: 2023-03-25
Payer: COMMERCIAL

## 2023-03-25 VITALS
HEIGHT: 66 IN | SYSTOLIC BLOOD PRESSURE: 110 MMHG | WEIGHT: 142 LBS | BODY MASS INDEX: 22.82 KG/M2 | OXYGEN SATURATION: 100 % | RESPIRATION RATE: 16 BRPM | DIASTOLIC BLOOD PRESSURE: 64 MMHG | HEART RATE: 46 BPM | TEMPERATURE: 97 F

## 2023-03-25 DIAGNOSIS — Z13.0 SCREENING FOR DEFICIENCY ANEMIA: ICD-10-CM

## 2023-03-25 DIAGNOSIS — K64.0 GRADE I HEMORRHOIDS: ICD-10-CM

## 2023-03-25 DIAGNOSIS — Z01.419 ENCOUNTER FOR WELL WOMAN EXAM: ICD-10-CM

## 2023-03-25 DIAGNOSIS — Z00.00 WELLNESS EXAMINATION: Primary | ICD-10-CM

## 2023-03-25 DIAGNOSIS — Z13.21 ENCOUNTER FOR VITAMIN DEFICIENCY SCREENING: ICD-10-CM

## 2023-03-25 DIAGNOSIS — Z11.3 SCREENING FOR STDS (SEXUALLY TRANSMITTED DISEASES): ICD-10-CM

## 2023-03-25 DIAGNOSIS — Z13.220 LIPID SCREENING: ICD-10-CM

## 2023-03-25 LAB
ALBUMIN SERPL-MCNC: 4.1 G/DL (ref 3.4–5)
ALBUMIN/GLOB SERPL: 1.5 {RATIO} (ref 1–2)
ALP LIVER SERPL-CCNC: 58 U/L
ALT SERPL-CCNC: 26 U/L
ANION GAP SERPL CALC-SCNC: 3 MMOL/L (ref 0–18)
AST SERPL-CCNC: 12 U/L (ref 15–37)
BASOPHILS # BLD AUTO: 0.06 X10(3) UL (ref 0–0.2)
BASOPHILS NFR BLD AUTO: 0.8 %
BILIRUB SERPL-MCNC: 0.6 MG/DL (ref 0.1–2)
BUN BLD-MCNC: 13 MG/DL (ref 7–18)
CALCIUM BLD-MCNC: 9 MG/DL (ref 8.5–10.1)
CHLORIDE SERPL-SCNC: 109 MMOL/L (ref 98–112)
CHOLEST SERPL-MCNC: 140 MG/DL (ref ?–200)
CO2 SERPL-SCNC: 28 MMOL/L (ref 21–32)
CREAT BLD-MCNC: 0.77 MG/DL
DEPRECATED HBV CORE AB SER IA-ACNC: 22.2 NG/ML
EOSINOPHIL # BLD AUTO: 0.21 X10(3) UL (ref 0–0.7)
EOSINOPHIL NFR BLD AUTO: 2.9 %
ERYTHROCYTE [DISTWIDTH] IN BLOOD BY AUTOMATED COUNT: 12.6 %
FASTING PATIENT LIPID ANSWER: YES
FASTING STATUS PATIENT QL REPORTED: YES
GFR SERPLBLD BASED ON 1.73 SQ M-ARVRAT: 104 ML/MIN/1.73M2 (ref 60–?)
GLOBULIN PLAS-MCNC: 2.8 G/DL (ref 2.8–4.4)
GLUCOSE BLD-MCNC: 83 MG/DL (ref 70–99)
HCT VFR BLD AUTO: 41.9 %
HDLC SERPL-MCNC: 74 MG/DL (ref 40–59)
HGB BLD-MCNC: 13.5 G/DL
IMM GRANULOCYTES # BLD AUTO: 0.02 X10(3) UL (ref 0–1)
IMM GRANULOCYTES NFR BLD: 0.3 %
IRON SATN MFR SERPL: 28 %
IRON SERPL-MCNC: 96 UG/DL
LDLC SERPL CALC-MCNC: 55 MG/DL (ref ?–100)
LYMPHOCYTES # BLD AUTO: 1.37 X10(3) UL (ref 1–4)
LYMPHOCYTES NFR BLD AUTO: 18.9 %
MCH RBC QN AUTO: 29.2 PG (ref 26–34)
MCHC RBC AUTO-ENTMCNC: 32.2 G/DL (ref 31–37)
MCV RBC AUTO: 90.7 FL
MONOCYTES # BLD AUTO: 0.42 X10(3) UL (ref 0.1–1)
MONOCYTES NFR BLD AUTO: 5.8 %
NEUTROPHILS # BLD AUTO: 5.18 X10 (3) UL (ref 1.5–7.7)
NEUTROPHILS # BLD AUTO: 5.18 X10(3) UL (ref 1.5–7.7)
NEUTROPHILS NFR BLD AUTO: 71.3 %
NONHDLC SERPL-MCNC: 66 MG/DL (ref ?–130)
OSMOLALITY SERPL CALC.SUM OF ELEC: 289 MOSM/KG (ref 275–295)
PLATELET # BLD AUTO: 219 10(3)UL (ref 150–450)
POTASSIUM SERPL-SCNC: 3.9 MMOL/L (ref 3.5–5.1)
PROT SERPL-MCNC: 6.9 G/DL (ref 6.4–8.2)
RBC # BLD AUTO: 4.62 X10(6)UL
SODIUM SERPL-SCNC: 140 MMOL/L (ref 136–145)
TIBC SERPL-MCNC: 347 UG/DL (ref 240–450)
TRANSFERRIN SERPL-MCNC: 233 MG/DL (ref 200–360)
TRIGL SERPL-MCNC: 48 MG/DL (ref 30–149)
VIT B12 SERPL-MCNC: 559 PG/ML (ref 193–986)
VIT D+METAB SERPL-MCNC: 26.6 NG/ML (ref 30–100)
VLDLC SERPL CALC-MCNC: 7 MG/DL (ref 0–30)
WBC # BLD AUTO: 7.3 X10(3) UL (ref 4–11)

## 2023-03-25 PROCEDURE — 3078F DIAST BP <80 MM HG: CPT

## 2023-03-25 PROCEDURE — 82607 VITAMIN B-12: CPT

## 2023-03-25 PROCEDURE — 83550 IRON BINDING TEST: CPT

## 2023-03-25 PROCEDURE — 87491 CHLMYD TRACH DNA AMP PROBE: CPT

## 2023-03-25 PROCEDURE — 82306 VITAMIN D 25 HYDROXY: CPT

## 2023-03-25 PROCEDURE — 87591 N.GONORRHOEAE DNA AMP PROB: CPT

## 2023-03-25 PROCEDURE — 86695 HERPES SIMPLEX TYPE 1 TEST: CPT

## 2023-03-25 PROCEDURE — 80061 LIPID PANEL: CPT

## 2023-03-25 PROCEDURE — 3008F BODY MASS INDEX DOCD: CPT

## 2023-03-25 PROCEDURE — 82728 ASSAY OF FERRITIN: CPT

## 2023-03-25 PROCEDURE — 3074F SYST BP LT 130 MM HG: CPT

## 2023-03-25 PROCEDURE — 83540 ASSAY OF IRON: CPT

## 2023-03-25 PROCEDURE — 99395 PREV VISIT EST AGE 18-39: CPT

## 2023-03-25 PROCEDURE — 85025 COMPLETE CBC W/AUTO DIFF WBC: CPT

## 2023-03-25 PROCEDURE — 86696 HERPES SIMPLEX TYPE 2 TEST: CPT

## 2023-03-25 PROCEDURE — 80053 COMPREHEN METABOLIC PANEL: CPT

## 2023-03-25 RX ORDER — HYDROCORTISONE 25 MG/G
CREAM TOPICAL
Qty: 30 G | Refills: 0 | Status: SHIPPED | OUTPATIENT
Start: 2023-03-25

## 2023-03-25 NOTE — PATIENT INSTRUCTIONS
Add a daily fiber supplement such as benfiber or metamucil.   Use tucks hemorrhoids pads and preporation H or hydrocortisone cream for hemorrhoid relief

## 2023-03-27 ENCOUNTER — PATIENT MESSAGE (OUTPATIENT)
Dept: FAMILY MEDICINE CLINIC | Facility: CLINIC | Age: 33
End: 2023-03-27

## 2023-03-27 LAB
C TRACH DNA SPEC QL NAA+PROBE: NEGATIVE
HSV 1 GLYCOPROTEIN G, IGG: NEGATIVE
HSV 2 GLYCOPROTEIN G, IGG: NEGATIVE
N GONORRHOEA DNA SPEC QL NAA+PROBE: NEGATIVE

## 2023-03-27 NOTE — TELEPHONE ENCOUNTER
From: Loni Jacques  To: CALEB tSarr  Sent: 3/27/2023 7:50 AM CDT  Subject: Cholesterol     It looks like my HDL Cholesterol is really high. Is that a concern? Also my physician form for work is asking what the Total Cholesterol/HDL Ratio is. What would that be?

## 2023-03-28 NOTE — TELEPHONE ENCOUNTER
*I added this information to the result note. HDL cholesterol is the 'good' cholesterol. It helps lower your risk of heart disease. The normal range is 40-60 but higher is better so yours being 74 is good. The ratio for your total cholesterol/HDL is 1.9 which is a good range.

## 2023-04-11 LAB — LAST PAP RESULT: NORMAL

## 2023-04-26 ENCOUNTER — OFFICE VISIT (OUTPATIENT)
Dept: OBGYN CLINIC | Facility: CLINIC | Age: 33
End: 2023-04-26
Payer: COMMERCIAL

## 2023-04-26 VITALS
HEART RATE: 65 BPM | WEIGHT: 143.31 LBS | SYSTOLIC BLOOD PRESSURE: 120 MMHG | BODY MASS INDEX: 23.03 KG/M2 | HEIGHT: 66 IN | DIASTOLIC BLOOD PRESSURE: 73 MMHG

## 2023-04-26 DIAGNOSIS — N90.7 SEBACEOUS CYST OF LABIA: ICD-10-CM

## 2023-04-26 DIAGNOSIS — N63.25 BREAST LUMP ON LEFT SIDE AT 12 O'CLOCK POSITION: Primary | ICD-10-CM

## 2023-04-26 PROCEDURE — 99213 OFFICE O/P EST LOW 20 MIN: CPT | Performed by: STUDENT IN AN ORGANIZED HEALTH CARE EDUCATION/TRAINING PROGRAM

## 2023-04-26 PROCEDURE — 3008F BODY MASS INDEX DOCD: CPT | Performed by: STUDENT IN AN ORGANIZED HEALTH CARE EDUCATION/TRAINING PROGRAM

## 2023-04-26 PROCEDURE — 3078F DIAST BP <80 MM HG: CPT | Performed by: STUDENT IN AN ORGANIZED HEALTH CARE EDUCATION/TRAINING PROGRAM

## 2023-04-26 PROCEDURE — 3074F SYST BP LT 130 MM HG: CPT | Performed by: STUDENT IN AN ORGANIZED HEALTH CARE EDUCATION/TRAINING PROGRAM

## 2023-05-03 DIAGNOSIS — F41.9 ANXIETY: ICD-10-CM

## 2023-05-03 RX ORDER — TRAZODONE HYDROCHLORIDE 50 MG/1
TABLET ORAL
Qty: 30 TABLET | Refills: 2 | Status: SHIPPED | OUTPATIENT
Start: 2023-05-03

## 2023-05-03 NOTE — TELEPHONE ENCOUNTER
Patient outreach : spoke with pt , pt states she seen Mitra Hamilton 3/25/23 inform pt to schedule an appointment 6months from the last office visit  to see Jarrett Johnson for medication check up . LOV: 3-11-22 with Dr. Niesha Stewart   LAST LAB: 3-25-23  LAST RX:     Medication Quantity Refills Start End   TRAZODONE 50 MG Oral Tab 30 tablet 2 1/31/2023    Sig: Markel Feeling 1 TABLET(50 MG) BY MOUTH EVERY NIGHT AS NEEDED FOR SLEEP     Next OV:No future appointments.    Shawn Lang

## 2023-05-19 ENCOUNTER — TELEPHONE (OUTPATIENT)
Dept: FAMILY MEDICINE CLINIC | Facility: CLINIC | Age: 33
End: 2023-05-19

## 2023-05-19 NOTE — TELEPHONE ENCOUNTER
Pt just finished therapy. Her therapist recommended she take naltrexone. She was advised to call her pcp to see if he would prescribe the medication.   Michelle Saravia

## 2023-05-31 ENCOUNTER — TELEPHONE (OUTPATIENT)
Facility: CLINIC | Age: 33
End: 2023-05-31

## 2023-05-31 RX ORDER — NALTREXONE HYDROCHLORIDE 50 MG/1
50 TABLET, FILM COATED ORAL DAILY
Qty: 30 TABLET | Refills: 0 | Status: SHIPPED | OUTPATIENT
Start: 2023-05-31

## 2023-05-31 NOTE — TELEPHONE ENCOUNTER
I sent a prescription for 50 mg daily to the [facility]. I need the patient to come into the office in the next 30 days and go over this with me. The recommended dose after 30 days is to go up to 100 mg so, after I see her, I will consider the increase.

## 2023-06-02 NOTE — TELEPHONE ENCOUNTER
Spoke with patient and she verbalized understanding. Patient scheduled for 6/5/23 with Dr. Dhiraj Deleon for review.   Future Appointments   Date Time Provider Charles Breen   6/5/2023 11:00 AM Polo Nova MD [facility] [place]

## 2023-06-12 ENCOUNTER — OFFICE VISIT (OUTPATIENT)
Dept: FAMILY MEDICINE CLINIC | Facility: CLINIC | Age: 33
End: 2023-06-12
Payer: COMMERCIAL

## 2023-06-12 VITALS
OXYGEN SATURATION: 98 % | HEART RATE: 56 BPM | DIASTOLIC BLOOD PRESSURE: 60 MMHG | SYSTOLIC BLOOD PRESSURE: 110 MMHG | RESPIRATION RATE: 12 BRPM | TEMPERATURE: 98 F | BODY MASS INDEX: 22.66 KG/M2 | WEIGHT: 141 LBS | HEIGHT: 66 IN

## 2023-06-12 DIAGNOSIS — Z79.899 ENCOUNTER FOR LONG-TERM (CURRENT) USE OF MEDICATIONS: Primary | ICD-10-CM

## 2023-06-12 DIAGNOSIS — F50.02 ANOREXIA NERVOSA, BINGE-EATING PURGING TYPE: Chronic | ICD-10-CM

## 2023-06-12 PROCEDURE — 99213 OFFICE O/P EST LOW 20 MIN: CPT | Performed by: FAMILY MEDICINE

## 2023-06-12 PROCEDURE — 3074F SYST BP LT 130 MM HG: CPT | Performed by: FAMILY MEDICINE

## 2023-06-12 PROCEDURE — 3078F DIAST BP <80 MM HG: CPT | Performed by: FAMILY MEDICINE

## 2023-06-12 PROCEDURE — 3008F BODY MASS INDEX DOCD: CPT | Performed by: FAMILY MEDICINE

## 2023-06-29 ENCOUNTER — TELEPHONE (OUTPATIENT)
Dept: FAMILY MEDICINE CLINIC | Facility: CLINIC | Age: 33
End: 2023-06-29

## 2023-06-29 RX ORDER — NALTREXONE HYDROCHLORIDE 50 MG/1
50 TABLET, FILM COATED ORAL DAILY
Qty: 30 TABLET | Refills: 5 | Status: SHIPPED | OUTPATIENT
Start: 2023-06-29 | End: 2023-06-29

## 2023-06-29 RX ORDER — NALTREXONE HYDROCHLORIDE 50 MG/1
100 TABLET, FILM COATED ORAL DAILY
Qty: 60 TABLET | Refills: 5 | Status: SHIPPED | OUTPATIENT
Start: 2023-06-29

## 2023-07-27 DIAGNOSIS — F41.9 ANXIETY: ICD-10-CM

## 2023-07-27 RX ORDER — TRAZODONE HYDROCHLORIDE 50 MG/1
50 TABLET ORAL NIGHTLY PRN
Qty: 30 TABLET | Refills: 2 | Status: SHIPPED | OUTPATIENT
Start: 2023-07-27

## 2023-10-25 DIAGNOSIS — F41.9 ANXIETY: ICD-10-CM

## 2023-10-25 RX ORDER — TRAZODONE HYDROCHLORIDE 50 MG/1
50 TABLET ORAL NIGHTLY PRN
Qty: 30 TABLET | Refills: 0 | Status: SHIPPED | OUTPATIENT
Start: 2023-10-25

## 2023-10-25 NOTE — TELEPHONE ENCOUNTER
Faxed request for trazodone 50 mg    LOV  6-12-23    LAST LAB  3/2023    LAST RX  7-27-23  #30  RF 2    Next OV  No future appointments.

## 2023-11-09 ENCOUNTER — OFFICE VISIT (OUTPATIENT)
Facility: CLINIC | Age: 33
End: 2023-11-09
Payer: COMMERCIAL

## 2023-11-09 VITALS
WEIGHT: 138 LBS | BODY MASS INDEX: 22.18 KG/M2 | DIASTOLIC BLOOD PRESSURE: 58 MMHG | HEART RATE: 53 BPM | HEIGHT: 66 IN | SYSTOLIC BLOOD PRESSURE: 108 MMHG

## 2023-11-09 DIAGNOSIS — R39.9 UTI SYMPTOMS: ICD-10-CM

## 2023-11-09 DIAGNOSIS — N89.8 VAGINAL DISCHARGE: Primary | ICD-10-CM

## 2023-11-09 LAB
APPEARANCE: CLEAR
BILIRUBIN: NEGATIVE
GLUCOSE (URINE DIPSTICK): NEGATIVE MG/DL
KETONES (URINE DIPSTICK): NEGATIVE MG/DL
LEUKOCYTES: NEGATIVE
MULTISTIX LOT#: NORMAL NUMERIC
NITRITE, URINE: NEGATIVE
OCCULT BLOOD: NEGATIVE
PH, URINE: 7 (ref 4.5–8)
PROTEIN (URINE DIPSTICK): NEGATIVE MG/DL
SPECIFIC GRAVITY: 1.01 (ref 1–1.03)
URINE-COLOR: YELLOW
UROBILINOGEN,SEMI-QN: 0.2 MG/DL (ref 0–1.9)

## 2023-11-09 PROCEDURE — 3074F SYST BP LT 130 MM HG: CPT

## 2023-11-09 PROCEDURE — 81002 URINALYSIS NONAUTO W/O SCOPE: CPT

## 2023-11-09 PROCEDURE — 3008F BODY MASS INDEX DOCD: CPT

## 2023-11-09 PROCEDURE — 3078F DIAST BP <80 MM HG: CPT

## 2023-11-09 PROCEDURE — 81514 NFCT DS BV&VAGINITIS DNA ALG: CPT

## 2023-11-09 PROCEDURE — 99212 OFFICE O/P EST SF 10 MIN: CPT

## 2023-11-09 NOTE — PROGRESS NOTES
Camilo Howe is a 28year old female  Patient's last menstrual period was 10/30/2023 (within days). Chief Complaint   Patient presents with    Gyn Problem     White vaginal discharge X 3-4 days, no itching   . OBSTETRICS HISTORY:  OB History    Para Term  AB Living   1       1     SAB IAB Ectopic Multiple Live Births                  # Outcome Date GA Lbr Stefan/2nd Weight Sex Delivery Anes PTL Lv   1 AB 2012    U           GYNE HISTORY:  Periods regular monthly    History   Sexual Activity    Sexual activity: Yes    Partners: Male                 MEDICAL HISTORY:  Past Medical History:   Diagnosis Date    ADHD (attention deficit hyperactivity disorder)     Anxiety state, unspecified     Back pain     Depression     Depressive disorder, not elsewhere classified     Eating disorder     Was at [facility], was discharged . PHP feltl thru because of insurance. SURGICAL HISTORY:  Past Surgical History:   Procedure Laterality Date    Appendectomy      Aug 21, 2013    Hernia surgery  2018    incisional herniorrhaphy    Middle ear surgery proc unlisted  @ 11 yrs old    Myringotomy, laser-assisted      Right ear with less hearing.      Tonsillectomy         SOCIAL HISTORY:  Social History     Socioeconomic History    Marital status: Single     Spouse name: Not on file    Number of children: Not on file    Years of education: Not on file    Highest education level: Not on file   Occupational History    Not on file   Tobacco Use    Smoking status: Former     Packs/day: 0.33     Years: 10.00     Additional pack years: 0.00     Total pack years: 3.30     Types: Cigarettes     Quit date: 2022     Years since quittin.8    Smokeless tobacco: Never    Tobacco comments:     third pack a day   Vaping Use    Vaping Use: Never used   Substance and Sexual Activity    Alcohol use: Not Currently     Alcohol/week: 6.0 standard drinks of alcohol     Types: 6 Cans of beer per week     Comment: 6 cans of beer 4 ormore times a week    Drug use: Not Currently     Frequency: 4.0 times per week     Types: Cannabis, Cocaine, \"Crack\" cocaine     Comment: smoked around 2 grams of crack a week    Sexual activity: Yes     Partners: Male   Other Topics Concern     Service Not Asked    Blood Transfusions Not Asked    Caffeine Concern Yes    Occupational Exposure Not Asked    Hobby Hazards Not Asked    Sleep Concern Not Asked    Stress Concern Not Asked    Weight Concern Not Asked    Special Diet Not Asked    Back Care Not Asked    Exercise No    Bike Helmet Not Asked    Seat Belt Yes    Self-Exams Not Asked   Social History Narrative    Not on file     Social Determinants of Health     Financial Resource Strain: Not on file   Food Insecurity: Not on file   Transportation Needs: Not on file   Physical Activity: Not on file   Stress: Not on file   Social Connections: Not on file   Housing Stability: Not on file       FAMILY HISTORY:  Family History   Problem Relation Age of Onset    Psychiatric Father     Other (Other) Father     Other (anxiety) Father     Other (Other) Mother     Other (osteoporosis) Mother        MEDICATIONS:    Current Outpatient Medications:     traZODone 50 MG Oral Tab, Take 1 tablet (50 mg total) by mouth nightly as needed for Sleep., Disp: 30 tablet, Rfl: 0    naltrexone 50 MG Oral Tab, Take 2 tablets (100 mg total) by mouth daily. , Disp: 60 tablet, Rfl: 5    ALLERGIES:    Allergies   Allergen Reactions    Dust Mite Extract WHEEZING    Penicillins ANAPHYLAXIS         PHYSICAL EXAM:   Pelvic Exam:  External Genitalia: normal appearance, hair distribution, and no lesions  Urethral Meatus:  normal in size, location, without lesions and prolapse  Bladder:  No fullness, masses or tenderness  Vagina:  Normal appearance without lesions, no abnormal discharge  Cervix:  Normal without tenderness on motion  Uterus: normal in size, contour, position, mobility, without tenderness  Adnexa: normal without masses or tenderness  Perineum: normal  Anus: no hemorroids     Assessment & Plan:  1. Vaginal discharge    - Vaginitis Vaginosis PCR Panel; Future    2.  UTI symptoms    - Urine Dip in office [50325]

## 2023-11-10 LAB
BV BACTERIA DNA VAG QL NAA+PROBE: POSITIVE
C GLABRATA DNA VAG QL NAA+PROBE: NEGATIVE
C KRUSEI DNA VAG QL NAA+PROBE: NEGATIVE
CANDIDA DNA VAG QL NAA+PROBE: NEGATIVE
T VAGINALIS DNA VAG QL NAA+PROBE: NEGATIVE

## 2023-11-11 ENCOUNTER — TELEPHONE (OUTPATIENT)
Dept: FAMILY MEDICINE CLINIC | Facility: CLINIC | Age: 33
End: 2023-11-11

## 2023-11-11 RX ORDER — METRONIDAZOLE 7.5 MG/G
1 GEL VAGINAL NIGHTLY
Qty: 1 EACH | Refills: 0 | Status: SHIPPED | OUTPATIENT
Start: 2023-11-11 | End: 2023-11-16

## 2023-11-11 NOTE — TELEPHONE ENCOUNTER
Pt had test done with her obgyn. It came back positive for bv. Dr office was supposed to call in script but didn't. She wanted to know if we can call a prescription in for her.   Pharmacy:  [facility] [place] [facility]

## 2023-11-11 NOTE — TELEPHONE ENCOUNTER
PC to pt. Spoke with pt. She states she was supposed to get a prescription from her OB for BV. Advised she should call her OB's on call physician to get this prescription. She v/u. She will call them. Closing note.

## 2023-11-20 DIAGNOSIS — F41.9 ANXIETY: ICD-10-CM

## 2023-11-20 RX ORDER — TRAZODONE HYDROCHLORIDE 50 MG/1
50 TABLET ORAL NIGHTLY PRN
Qty: 30 TABLET | Refills: 0 | Status: SHIPPED | OUTPATIENT
Start: 2023-11-20

## 2023-11-20 NOTE — TELEPHONE ENCOUNTER
OV 06/12/23  REFILL 10/25 #30    Requested Prescriptions     Pending Prescriptions Disp Refills    TRAZODONE 50 MG Oral Tab [Pharmacy Med Name: TRAZODONE 50MG TABLETS] 30 tablet 0     Sig: TAKE 1 TABLET(50 MG) BY MOUTH EVERY NIGHT AS NEEDED FOR SLEEP     Future Appointments   Date Time Provider Charles Breen   11/21/2023  1:40 PM Harriet Rollins MD [facility] [place]

## 2023-11-21 ENCOUNTER — OFFICE VISIT (OUTPATIENT)
Dept: FAMILY MEDICINE CLINIC | Facility: CLINIC | Age: 33
End: 2023-11-21
Payer: COMMERCIAL

## 2023-11-21 VITALS
HEIGHT: 66 IN | BODY MASS INDEX: 21.38 KG/M2 | HEART RATE: 52 BPM | SYSTOLIC BLOOD PRESSURE: 100 MMHG | OXYGEN SATURATION: 100 % | DIASTOLIC BLOOD PRESSURE: 70 MMHG | TEMPERATURE: 98 F | WEIGHT: 133 LBS | RESPIRATION RATE: 12 BRPM

## 2023-11-21 DIAGNOSIS — Z71.1 PERSON WITH FEARED HEALTH COMPLAINT IN WHOM NO DIAGNOSIS IS MADE: Primary | ICD-10-CM

## 2023-11-21 PROCEDURE — 3078F DIAST BP <80 MM HG: CPT | Performed by: FAMILY MEDICINE

## 2023-11-21 PROCEDURE — 3008F BODY MASS INDEX DOCD: CPT | Performed by: FAMILY MEDICINE

## 2023-11-21 PROCEDURE — 3074F SYST BP LT 130 MM HG: CPT | Performed by: FAMILY MEDICINE

## 2023-11-21 PROCEDURE — 99213 OFFICE O/P EST LOW 20 MIN: CPT | Performed by: FAMILY MEDICINE

## 2023-12-12 ENCOUNTER — TELEPHONE (OUTPATIENT)
Facility: CLINIC | Age: 33
End: 2023-12-12

## 2023-12-12 NOTE — TELEPHONE ENCOUNTER
Pt calling to speak to a nurse about her prescription from November for [address]. Pt states she got her period and then the BV came back. Pt does not want to come back in but would like the [facility] PILL prescribed now. Please advise.

## 2023-12-12 NOTE — TELEPHONE ENCOUNTER
Discussed VENITA Howard msg:  she would need to come in and get tested. If she does not want to come in, she can try boric acid vaginally for two weeks. If s/s persist to schedule appt for vaginal ID. Patient verbalized understanding, agreed to and intend to comply with plan of care.

## 2023-12-12 NOTE — TELEPHONE ENCOUNTER
Pt requesting for oral RX for BV, still having s/s    11/9/23 seen in office for [facility]. discharge    [address] 11/13/23 with metronidazole cream     Will route request for approval and call pt. Patient verbalized understanding, agreed to and intend to comply with plan of care.

## 2023-12-13 ENCOUNTER — TELEPHONE (OUTPATIENT)
Dept: FAMILY MEDICINE CLINIC | Facility: CLINIC | Age: 33
End: 2023-12-13

## 2023-12-13 DIAGNOSIS — B96.89 BACTERIAL VAGINITIS: Primary | ICD-10-CM

## 2023-12-13 DIAGNOSIS — N76.0 BACTERIAL VAGINITIS: Primary | ICD-10-CM

## 2023-12-13 RX ORDER — METRONIDAZOLE 500 MG/1
500 TABLET ORAL 2 TIMES DAILY
Qty: 14 TABLET | Refills: 0 | Status: SHIPPED | OUTPATIENT
Start: 2023-12-13 | End: 2023-12-20

## 2023-12-13 NOTE — TELEPHONE ENCOUNTER
Patient notified of provider comments/recommendations. Verbalized understanding and is in agreement with plan.

## 2023-12-13 NOTE — TELEPHONE ENCOUNTER
Per patient saw APRN 11-9-23 and diagnosed with bacterial vaginosis. Did use the prescribed medication but started period shortly after so not sure that medication had full effect. Still having vaginal discharge with foul odor. Wondering if could get medication does not want to return to gyne office at this time.      [facility] Get Fractal

## 2023-12-13 NOTE — TELEPHONE ENCOUNTER
Pt was diagnosed with bacterial vaginosis. Her obgyn had given her a gel. She is not sure if the gel worked because she got her period right after. She wanted to know if dr can prescribe oral antibiotic. Her obgyn wanted her to come back into office but she doesn't want to be seen again.   [facility]:  [facility]

## 2023-12-19 DIAGNOSIS — F41.9 ANXIETY: ICD-10-CM

## 2023-12-19 RX ORDER — TRAZODONE HYDROCHLORIDE 50 MG/1
50 TABLET ORAL NIGHTLY PRN
Qty: 30 TABLET | Refills: 3 | Status: SHIPPED | OUTPATIENT
Start: 2023-12-19

## 2023-12-19 NOTE — TELEPHONE ENCOUNTER
OV 11/21/23  REFILL 11/20 #30    Requested Prescriptions     Pending Prescriptions Disp Refills    TRAZODONE 50 MG Oral Tab [Pharmacy Med Name: TRAZODONE 50MG TABLETS] 30 tablet 0     Sig: TAKE 1 TABLET(50 MG) BY MOUTH EVERY NIGHT AS NEEDED FOR SLEEP     No future appointments.

## 2023-12-27 RX ORDER — NALTREXONE HYDROCHLORIDE 50 MG/1
100 TABLET, FILM COATED ORAL DAILY
Qty: 60 TABLET | Refills: 5 | Status: SHIPPED | OUTPATIENT
Start: 2023-12-27

## 2024-04-17 DIAGNOSIS — F41.9 ANXIETY: ICD-10-CM

## 2024-04-18 RX ORDER — TRAZODONE HYDROCHLORIDE 50 MG/1
50 TABLET ORAL NIGHTLY PRN
Qty: 30 TABLET | Refills: 3 | Status: SHIPPED | OUTPATIENT
Start: 2024-04-18

## 2024-04-18 NOTE — TELEPHONE ENCOUNTER
Last refill: 12/19/23  qtY: 30  W/ 3 refills  Last ov: 11/21/23    Requested Prescriptions     Pending Prescriptions Disp Refills    traZODone 50 MG Oral Tab 30 tablet 3     Sig: Take 1 tablet (50 mg total) by mouth nightly as needed for Sleep.     No future appointments.

## 2024-07-23 DIAGNOSIS — F41.9 ANXIETY: ICD-10-CM

## 2024-07-23 RX ORDER — TRAZODONE HYDROCHLORIDE 50 MG/1
50 TABLET ORAL NIGHTLY PRN
Qty: 30 TABLET | Refills: 3 | Status: SHIPPED | OUTPATIENT
Start: 2024-07-23

## 2024-07-23 NOTE — TELEPHONE ENCOUNTER
Requested Prescriptions     Pending Prescriptions Disp Refills    traZODone 50 MG Oral Tab 30 tablet 3     Sig: Take 1 tablet (50 mg total) by mouth nightly as needed for Sleep.        Last refill: 4/18/24 30 tabs 3 refills    Last Appointment: 11/21/23    Next Appointment: No future OV's scheduled

## 2024-08-27 ENCOUNTER — TELEPHONE (OUTPATIENT)
Dept: FAMILY MEDICINE CLINIC | Facility: CLINIC | Age: 34
End: 2024-08-27

## 2024-09-07 NOTE — TELEPHONE ENCOUNTER
Spoke with patient who was seen 2 weeks ago in the ER for stomach and breathing issues. Patient states she still has a difficult time at times after eating with stomach pain. She thinks this may be acid reflux. She also admits to having a relapse with her bulimia. She wants to make sure she doesn't have a stomach ulcer. Patient is in no acute distress at this time. Advised if she does develop any SOB she needs to call 911 or go to ER. She verbalized understanding. Patient wanted to schedule for next week with Dr. Lewis Gabriel.   Future Appointments   Date Time Provider Charles Breen   7/29/2022  9:45 AM Marito Nova MD [facility] s/p TURBT/other

## 2024-09-13 ENCOUNTER — OFFICE VISIT (OUTPATIENT)
Dept: FAMILY MEDICINE CLINIC | Facility: CLINIC | Age: 34
End: 2024-09-13
Payer: COMMERCIAL

## 2024-09-13 ENCOUNTER — MED REC SCAN ONLY (OUTPATIENT)
Dept: FAMILY MEDICINE CLINIC | Facility: CLINIC | Age: 34
End: 2024-09-13

## 2024-09-13 VITALS
BODY MASS INDEX: 22.76 KG/M2 | OXYGEN SATURATION: 98 % | DIASTOLIC BLOOD PRESSURE: 70 MMHG | HEIGHT: 66 IN | RESPIRATION RATE: 18 BRPM | SYSTOLIC BLOOD PRESSURE: 112 MMHG | TEMPERATURE: 99 F | WEIGHT: 141.63 LBS | HEART RATE: 45 BPM

## 2024-09-13 DIAGNOSIS — F43.21 ADJUSTMENT DISORDER WITH DEPRESSED MOOD: ICD-10-CM

## 2024-09-13 DIAGNOSIS — F42.2 MIXED OBSESSIONAL THOUGHTS AND ACTS: ICD-10-CM

## 2024-09-13 DIAGNOSIS — F43.10 POSTTRAUMATIC STRESS DISORDER: ICD-10-CM

## 2024-09-13 DIAGNOSIS — F41.1 GAD (GENERALIZED ANXIETY DISORDER): ICD-10-CM

## 2024-09-13 DIAGNOSIS — F90.2 ATTENTION DEFICIT HYPERACTIVITY DISORDER (ADHD), COMBINED TYPE: ICD-10-CM

## 2024-09-13 DIAGNOSIS — F50.02 ANOREXIA NERVOSA, BINGE-EATING PURGING TYPE: Chronic | ICD-10-CM

## 2024-09-13 DIAGNOSIS — Z00.00 WELL ADULT EXAM: Primary | ICD-10-CM

## 2024-09-13 NOTE — PROGRESS NOTES
Subjective:   Cora Das is a 33 year old female who presents for Physical (Paper work, to work from home )     Here for evaluation  States she has been at home for 3 years    Now has mandatory physical work availability    She has issues with panic and wishes to work from home permannent   And her counselor agrees    She has had increasing panic with the commute  and intrusive thoughts because of this    She states that   She needs permanent remote work  And we will fill FMLA accomodation forms for the patient         Stable on medications    Needs no further labs    No known injury    History/Other:   has a current medication list which includes the following prescription(s): trazodone and naltrexone.     is allergic to dust mite extract and penicillins.     Review of Systems:  GENERAL HEALTH: feels well no complaints  SKIN: denies any unusual skin lesions or rashes  RESPIRATORY: denies shortness of breath with exertion  CARDIOVASCULAR: denies chest pain on exertion  GI: denies abdominal pain and denies heartburn  NEURO: denies headaches  PSY  see HPI     Objective:   /70 (BP Location: Left arm, Patient Position: Sitting, Cuff Size: adult)   Pulse (!) 45   Temp 98.7 °F (37.1 °C) (Temporal)   Resp 18   Ht 5' 6\" (1.676 m)   Wt 141 lb 9.6 oz (64.2 kg)   LMP 08/30/2024 (Within Days)   SpO2 98%   BMI 22.85 kg/m²  Estimated body mass index is 22.85 kg/m² as calculated from the following:    Height as of this encounter: 5' 6\" (1.676 m).    Weight as of this encounter: 141 lb 9.6 oz (64.2 kg).  GENERAL: well developed, well nourished,in no apparent distress  SKIN: no rashes,no suspicious lesions  LUNGS: clear to auscultation  CARDIO: RRR without murmur  MOOD  Well informed  Good focus insightful  Alert   On topic     EXTREMITIES: no cyanosis, clubbing or edema    Assessment & Plan:   1. Well adult exam (Primary)  2. Mixed obsessional thoughts and acts  3. Adjustment disorder with depressed mood  4.  Anorexia nervosa, binge-eating purging type (HCC)  5. ROSEY (generalized anxiety disorder)  6. Posttraumatic stress disorder  7. Attention deficit hyperactivity disorder (ADHD), combined type             Form completed in the office   see scanned form      Marco Nova MD, 9/13/2024, 10:37 AM

## 2024-12-23 ENCOUNTER — TELEPHONE (OUTPATIENT)
Dept: FAMILY MEDICINE CLINIC | Facility: CLINIC | Age: 34
End: 2024-12-23

## 2024-12-23 DIAGNOSIS — F41.9 ANXIETY: ICD-10-CM

## 2024-12-23 RX ORDER — TRAZODONE HYDROCHLORIDE 100 MG/1
100 TABLET ORAL NIGHTLY PRN
Qty: 30 TABLET | Refills: 2 | Status: SHIPPED | OUTPATIENT
Start: 2024-12-23 | End: 2025-03-23

## 2024-12-23 RX ORDER — TRAZODONE HYDROCHLORIDE 50 MG/1
50 TABLET, FILM COATED ORAL NIGHTLY PRN
Refills: 0 | Status: CANCELLED | OUTPATIENT
Start: 2024-12-23

## 2024-12-23 NOTE — TELEPHONE ENCOUNTER
LOV: 9/13/2024    LAST LAB: 3/25/23    LAST RX:  traZODone 50 MG Oral Tab 30 tablet 3 7/23/2024 --   Sig:   Take 1 tablet (50 mg total) by mouth nightly as needed for Sleep.       : No future appointments.     PROTOCOL

## 2024-12-23 NOTE — TELEPHONE ENCOUNTER
Patient needs a refill on-    traZODone 50 MG Oral Tab     Patient would like an increase in dosage-been waking up a lot at night -     Please advise thank you    [id number] [address] [place] [facility] [place] [address]  & , [phone], [phone]

## 2024-12-23 NOTE — TELEPHONE ENCOUNTER
See intake message regarding increasing trazodone    LOV  9-13-24    LAST LAB  3-25-23    LAST RX  11-19-24  #30    Next OV  No future appointments.    PROTOCOL  None

## 2024-12-23 NOTE — TELEPHONE ENCOUNTER
Disp Refills Start End    traZODone 100 MG Oral Tab 30 tablet 2 12/23/2024 3/23/2025    Sig - Route: Take 1 tablet (100 mg total) by mouth nightly as needed for Sleep. - Oral    Sent to pharmacy as: traZODone HCl 100 MG Oral Tablet (Desyrel)    Notes to Pharmacy: Note new dosage 12/23/2024    E-Prescribing Status: Receipt confirmed by pharmacy (12/23/2024  1:49 PM CST)          Pt advised medication send to [facility]

## 2025-03-03 ENCOUNTER — TELEPHONE (OUTPATIENT)
Dept: FAMILY MEDICINE CLINIC | Facility: CLINIC | Age: 35
End: 2025-03-03

## 2025-03-03 NOTE — TELEPHONE ENCOUNTER
Patient needs a refill on-    traZODone 100 MG Oral Tab     [id number] - [facility] [address] [place] [facility] [address]  & , [phone], [phone]     Thank you

## 2025-03-03 NOTE — TELEPHONE ENCOUNTER
Request for refill on trazodone 100 mg    LOV  9-13-24    LAST LAB  3-25-23    LAST RX  2-28-25  #30    Next OV   No future appointments.      Call placed to pharmacy  spoke with Emma and she verified that prescription is ready for .    Patient notified of above and agrees to contact office with any issues getting medication

## 2025-04-18 DIAGNOSIS — F41.9 ANXIETY: ICD-10-CM

## 2025-04-18 RX ORDER — TRAZODONE HYDROCHLORIDE 100 MG/1
100 TABLET ORAL NIGHTLY PRN
Qty: 30 TABLET | Refills: 0 | Status: SHIPPED | OUTPATIENT
Start: 2025-04-18 | End: 2025-07-17

## 2025-04-18 NOTE — TELEPHONE ENCOUNTER
Refill request for trazodone 50 mg    LOV  9-13-24    LAST LAB  3-25-23    LAST RX  12-23-24  #30  RF 2    Next OV No future appointments.    PROTOCOL  None      Per patient usually only comes in for yearly physical.

## 2025-05-18 DIAGNOSIS — F41.9 ANXIETY: ICD-10-CM

## 2025-05-19 RX ORDER — TRAZODONE HYDROCHLORIDE 100 MG/1
100 TABLET ORAL NIGHTLY PRN
Qty: 30 TABLET | Refills: 0 | Status: SHIPPED | OUTPATIENT
Start: 2025-05-19 | End: 2025-08-17

## 2025-05-19 NOTE — TELEPHONE ENCOUNTER
Requested Prescriptions     Pending Prescriptions Disp Refills    TRAZODONE 100 MG Oral Tab [Pharmacy Med Name: Trazodone Hydrochloride 100 Mg Tab Zydu] 30 tablet 0     Sig: Take 1 tablet (100 mg total) by mouth nightly as needed for Sleep.     Last refill 4/18/25 #30  LOV 9/13/24  No future appointments.

## 2025-06-25 DIAGNOSIS — F41.9 ANXIETY: ICD-10-CM

## 2025-06-25 RX ORDER — TRAZODONE HYDROCHLORIDE 100 MG/1
100 TABLET ORAL NIGHTLY PRN
Qty: 30 TABLET | Refills: 0 | Status: SHIPPED | OUTPATIENT
Start: 2025-06-25 | End: 2025-09-23

## 2025-07-26 DIAGNOSIS — F41.9 ANXIETY: ICD-10-CM

## 2025-07-26 RX ORDER — TRAZODONE HYDROCHLORIDE 100 MG/1
100 TABLET ORAL NIGHTLY PRN
Qty: 30 TABLET | Refills: 0 | Status: SHIPPED | OUTPATIENT
Start: 2025-07-26 | End: 2025-10-24

## 2025-07-26 NOTE — TELEPHONE ENCOUNTER
TRAZODONE 100 MG Oral Tab   Last office visit: px: 9/13/24  No future appointments.  Last filled: 6/25/25 #30 tabs  Last labs: 3/25/23 CBC, CMP, Lipid Vit D, Vit B12, Iron & TIBC, Ferritin   Last BP:   BP Readings from Last 3 Encounters:   09/13/24 112/70   11/21/23 100/70   11/09/23 108/58

## (undated) NOTE — Clinical Note
Lorrainemaria aitalia Barron saw [facility] in the office today. As you know she presents with an umbilical hernia. She will call back to schedule at her next available convenience.   Thank you Bill Lai

## (undated) NOTE — LETTER
2018    Return to Work    Name: Sharon Rodriguez        : 10/6/1990    To Whom It May Concern,    Sharon Rodriguez had surgery on 17 and is:      Unable to return to work until 2019.      May return to work on 2019 with

## (undated) NOTE — LETTER
Date: 8/25/2021    Patient Name: Olegario Cogan          To Whom it may concern: The above patient was seen at the [facility] for treatment of a medical condition.     This patient should be excused from attending work/school from 8/20/2

## (undated) NOTE — LETTER
AUTHORIZATION FOR SURGICAL OPERATION OR OTHER PROCEDURE    1. I hereby authorize Dr. Marci Mackay, and [facility] staff assigned to my case to perform the following operation and/or procedure at the [facility], [facility]:    _______________________________________________________________________________________________    Cortisone injection Right foot  _______________________________________________________________________________________________    2. My physician has explained the nature and purpose of the operation or other procedure, possible alternative methods of treatment, the risks involved, and the possibility of complication to me. I acknowledge that no guarantee has been made as to the result that may be obtained. 3.  I recognize that, during the course of this operation, or other procedure, unforseen conditions may necessitate additional or different procedure than those listed above. I, therefore, further authorize and request that the above named physician, his/her physician assistants or designees perform such procedures as are, in his/her professional opinion, necessary and desirable. 4.  Any tissue or organs removed in the operation or other procedure may be disposed of by and at the discretion of the [facility], [facility] and [facility] AT [facility]. 5.  I understand that in the event of a medical emergency, I will be transported by local paramedics to [facility] or other hospital emergency department. 6.  I certify that I have read and fully understand the above consent to operation and/or other procedure. 7.  I acknowledge that my physician has explained sedation/analgesia administration to me including the risks and benefits. I consent to the administration of sedation/analgesia as may be necessary or desirable in the judgement of my physician.     Witness signature: ___________________________________________________ Date:  ______/______/_____ Time:  ________ A. M.  P.M. Patient Name:  ______________________________________________________  (please print)      Patient signature:  ___________________________________________________             Relationship to Patient:           []  Parent    Responsible person                          []  Spouse  In case of minor or                    [] Other  _____________   Incompetent name:  __________________________________________________                               (please print)      _____________      Responsible person  In case of minor or  Incompetent signature:  _______________________________________________    Statement of Physician  My signature below affirms that prior to the time of the procedure, I have explained to the patient and/or his/her guardian, the risks and benefits involved in the proposed treatment and any reasonable alternative to the proposed treatment. I have also explained the risks and benefits involved in the refusal of the proposed treatment and have answered the patient's questions.                         Date:  ______/______/_______  Provider                      Signature:  __________________________________________________________       Time:  ___________ A.M    P.M.